# Patient Record
Sex: FEMALE | Race: BLACK OR AFRICAN AMERICAN | NOT HISPANIC OR LATINO | Employment: UNEMPLOYED | URBAN - METROPOLITAN AREA
[De-identification: names, ages, dates, MRNs, and addresses within clinical notes are randomized per-mention and may not be internally consistent; named-entity substitution may affect disease eponyms.]

---
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
## 2020-12-16 NOTE — PROGRESS NOTES
"[facility]  Malena Chen  2000    Malena Chen is a 16  y.o. 11  m.o. female presenting for evaluation of   Chief Complaint   Patient presents with    lightheaded/dizzy/ post concussive     November 2016 hit in head with soccer ball.  Began having dizziness, headaches, lightheadedness about a month later.  episodes last 1-2 min.  These happen daily, multiple times a day.  She continued to play soccer, episodes did not happen after playing.  States she has  3 cartons of juice with breakfast and lunch. and one bottle of water at school. States on occasion during episodes, she feels "a rapid HR".  Last rapid HR several days ago.   .     Subjective:     Malena is here today with her mother. She comes in for evaluation of the following concerns:   1. Dizzy    2. Light headed    3. Premature ventricular contraction          HPI:     Malena has noticed episodes for past several months where she feels dizzy, light-headed and that she is spinning.  The episodes seem to be getting more frequent.  She usually has the episodes while sitting and they are brief 30 seconds to 2 minutes and associated with a headache.  They occur at random.  She got hit with a soccer ball in the head in November and the episodes started after that.  She also has episodes when she is standing.  With the episodes, she sometimes feels stuck and that she needs to stay still to get the episodes to go away.  Sometimes her HR feels fast with these episodes.  Mom says her HR has been low at times.  She has had one episode of chest pain while running in PE a month ago.  She felt a tightness in the center of her chest.  She has not been back to PE due to her abnormal ECG (frequent PVCs) and symptoms.  She normally does track workouts and plays soccer.  She drinks a lot of juice but not much water.  She has always felt some SOB with running.    There are no reports of exercise intolerance and syncope. No other cardiovascular or " medical concerns are reported.     Medications:   No current outpatient prescriptions on file prior to visit.     No current facility-administered medications on file prior to visit.      Allergies: Review of patient's allergies indicates:  No Known Allergies  Immunization Status: up to date and documented.     Family History   Problem Relation Age of Onset    Hypertension Father     Coronary artery disease Father     Heart attacks under age 50 Father     Hypertension Maternal Grandfather     Diabetes Maternal Grandfather     Prostate cancer Maternal Grandfather     No Known Problems Mother     Hypertension Brother     Diabetes Maternal Aunt     Hypertension Maternal Aunt     Depression Maternal Aunt     Cancer Paternal Aunt     Cancer Maternal Grandmother     Coronary artery disease Maternal Grandmother     Hypertension Paternal Grandmother     Cancer Paternal Grandmother     Hypertension Paternal Grandfather     No Known Problems Brother     No Known Problems Brother     No Known Problems Brother     Anemia Neg Hx     Cardiomyopathy Neg Hx     Childhood respiratory disease Neg Hx     Clotting disorder Neg Hx     Congenital heart disease Neg Hx     Deafness Neg Hx     Early death Neg Hx     Pacemaker/defibrilator Neg Hx     Premature birth Neg Hx     Seizures Neg Hx     SIDS Neg Hx      No past medical history on file.  Family and past medical history reviewed and present in electronic medical record.     ROS:     Review of Systems   Constitutional: Negative for activity change, fatigue and unexpected weight change.   HENT: Negative for congestion, facial swelling, nosebleeds and sore throat.    Eyes: Negative for discharge and redness.   Respiratory: Negative for shortness of breath, wheezing and stridor.    Cardiovascular: Positive for palpitations. Negative for chest pain and leg swelling.   Gastrointestinal: Negative for abdominal distention, abdominal pain, blood in stool,  constipation, diarrhea and nausea.   Musculoskeletal: Negative for arthralgias and joint swelling.   Skin: Negative for color change.   Neurological: Positive for dizziness, light-headedness and headaches. Negative for syncope and facial asymmetry.   Hematological: Negative for adenopathy. Does not bruise/bleed easily.       Objective:     Physical Exam   Constitutional: She is oriented to person, place, and time. She appears well-developed and well-nourished. No distress.   HENT:   Head: Normocephalic and atraumatic.   Nose: Nose normal.   Mouth/Throat: Oropharynx is clear and moist.   Eyes: Conjunctivae and EOM are normal. No scleral icterus.   Neck: Normal range of motion. No JVD present.   Cardiovascular: Normal rate, regular rhythm, normal heart sounds and intact distal pulses.  Exam reveals no gallop and no friction rub.    No murmur heard.  Pulmonary/Chest: Effort normal and breath sounds normal. No stridor. She has no wheezes. She exhibits no tenderness.   Abdominal: Soft. Bowel sounds are normal. She exhibits no distension and no mass. There is no tenderness.   Musculoskeletal: Normal range of motion. She exhibits no edema.   Neurological: She is alert and oriented to person, place, and time. Coordination normal.   Skin: Skin is warm and dry.       Tests:     I evaluated the following studies:   EKG:  Sinus rhythm with frequent PVCs    Echocardiogram:   Normal for age  (Full report in electronic medical record)      Assessment:     1. Dizzy    2. Light headed    3. Premature ventricular contraction            Impression:     It is my impression that Malena Chen has episodes where she feels that she is spinning associated with dizzy, light headed and at times headaches and palpitations.  These are not related to being upright or postural position changes and seem less likely to be related to dysautonomia so she may benefit from a neurologic evaluation.  She was also found to have PVCs on her ECG.  I  doubt these are causing any symptoms.  We placed a Holter today to determine her ectopic burden as well as try to correlate symptoms with rhythm.  I discussed my findings with Malena and her mother and answered all questions.  She has not been exercising recently but once she is feeling better, we should see her back for a treadmill test due to the frequent PVCs.    Plan:     Activity:  No restrictions    Medications:  No new    Endocarditis prophylaxis is not recommended in this circumstance.     Follow-Up:     Follow-Up clinic visit in a year with echo, ECG and Holter.   Krystian Franco Hearing intact (VIII)

## 2025-05-24 ENCOUNTER — HOSPITAL ENCOUNTER (EMERGENCY)
Facility: HOSPITAL | Age: 25
Discharge: HOME | End: 2025-05-25
Attending: EMERGENCY MEDICINE
Payer: MEDICAID

## 2025-05-24 DIAGNOSIS — O46.90 VAGINAL BLEEDING IN PREGNANCY: Primary | ICD-10-CM

## 2025-05-24 LAB
B-HCG UR QL: POSITIVE
CTP QC/QA: YES

## 2025-05-24 PROCEDURE — 81025 URINE PREGNANCY TEST: CPT | Performed by: EMERGENCY MEDICINE

## 2025-05-24 PROCEDURE — 99284 EMERGENCY DEPT VISIT MOD MDM: CPT

## 2025-05-25 VITALS
HEART RATE: 78 BPM | OXYGEN SATURATION: 100 % | HEIGHT: 65 IN | WEIGHT: 183 LBS | RESPIRATION RATE: 18 BRPM | SYSTOLIC BLOOD PRESSURE: 128 MMHG | BODY MASS INDEX: 30.49 KG/M2 | DIASTOLIC BLOOD PRESSURE: 90 MMHG | TEMPERATURE: 98 F

## 2025-05-25 LAB
ABORH RETYPE: NORMAL
ABSOLUTE EOSINOPHIL (OHS): 0.3 K/UL
ABSOLUTE MONOCYTE (OHS): 0.6 K/UL (ref 0.3–1)
ABSOLUTE NEUTROPHIL COUNT (OHS): 2.49 K/UL (ref 1.8–7.7)
ALBUMIN SERPL BCP-MCNC: 3.5 G/DL (ref 3.5–5.2)
ALP SERPL-CCNC: 55 UNIT/L (ref 40–150)
ALT SERPL W/O P-5'-P-CCNC: 26 UNIT/L (ref 10–44)
ANION GAP (OHS): 6 MMOL/L (ref 8–16)
AST SERPL-CCNC: 17 UNIT/L (ref 11–45)
BASOPHILS # BLD AUTO: 0.03 K/UL
BASOPHILS NFR BLD AUTO: 0.5 %
BILIRUB SERPL-MCNC: 0.2 MG/DL (ref 0.1–1)
BUN SERPL-MCNC: 12 MG/DL (ref 6–20)
CALCIUM SERPL-MCNC: 8.7 MG/DL (ref 8.7–10.5)
CHLORIDE SERPL-SCNC: 108 MMOL/L (ref 95–110)
CO2 SERPL-SCNC: 21 MMOL/L (ref 23–29)
CREAT SERPL-MCNC: 0.8 MG/DL (ref 0.5–1.4)
ERYTHROCYTE [DISTWIDTH] IN BLOOD BY AUTOMATED COUNT: 18.1 % (ref 11.5–14.5)
GFR SERPLBLD CREATININE-BSD FMLA CKD-EPI: >60 ML/MIN/1.73/M2
GLUCOSE SERPL-MCNC: 95 MG/DL (ref 70–110)
HCG INTACT+B SERPL-ACNC: NORMAL MIU/ML
HCT VFR BLD AUTO: 31.6 % (ref 37–48.5)
HGB BLD-MCNC: 10.1 GM/DL (ref 12–16)
IMM GRANULOCYTES # BLD AUTO: 0.02 K/UL (ref 0–0.04)
IMM GRANULOCYTES NFR BLD AUTO: 0.3 % (ref 0–0.5)
LYMPHOCYTES # BLD AUTO: 2.77 K/UL (ref 1–4.8)
MCH RBC QN AUTO: 22.9 PG (ref 27–31)
MCHC RBC AUTO-ENTMCNC: 32 G/DL (ref 32–36)
MCV RBC AUTO: 72 FL (ref 82–98)
NUCLEATED RBC (/100WBC) (OHS): 0 /100 WBC
PLATELET # BLD AUTO: 232 K/UL (ref 150–450)
PMV BLD AUTO: 10.4 FL (ref 9.2–12.9)
POTASSIUM SERPL-SCNC: 3.9 MMOL/L (ref 3.5–5.1)
PROT SERPL-MCNC: 6.9 GM/DL (ref 6–8.4)
RBC # BLD AUTO: 4.42 M/UL (ref 4–5.4)
RELATIVE EOSINOPHIL (OHS): 4.8 %
RELATIVE LYMPHOCYTE (OHS): 44.6 % (ref 18–48)
RELATIVE MONOCYTE (OHS): 9.7 % (ref 4–15)
RELATIVE NEUTROPHIL (OHS): 40.1 % (ref 38–73)
RH BLD: NORMAL
SODIUM SERPL-SCNC: 135 MMOL/L (ref 136–145)
WBC # BLD AUTO: 6.21 K/UL (ref 3.9–12.7)

## 2025-05-25 PROCEDURE — 84702 CHORIONIC GONADOTROPIN TEST: CPT | Performed by: EMERGENCY MEDICINE

## 2025-05-25 PROCEDURE — 85025 COMPLETE CBC W/AUTO DIFF WBC: CPT | Performed by: EMERGENCY MEDICINE

## 2025-05-25 PROCEDURE — 80053 COMPREHEN METABOLIC PANEL: CPT | Performed by: EMERGENCY MEDICINE

## 2025-05-25 PROCEDURE — 86901 BLOOD TYPING SEROLOGIC RH(D): CPT | Performed by: EMERGENCY MEDICINE
